# Patient Record
Sex: MALE | Race: WHITE
[De-identification: names, ages, dates, MRNs, and addresses within clinical notes are randomized per-mention and may not be internally consistent; named-entity substitution may affect disease eponyms.]

---

## 2015-12-06 VITALS — DIASTOLIC BLOOD PRESSURE: 75 MMHG | SYSTOLIC BLOOD PRESSURE: 145 MMHG

## 2017-08-18 ENCOUNTER — HOSPITAL ENCOUNTER (OUTPATIENT)
Dept: HOSPITAL 61 - KCIC | Age: 57
Discharge: HOME | End: 2017-08-18
Attending: INTERNAL MEDICINE
Payer: MEDICARE

## 2017-08-18 DIAGNOSIS — I27.2: ICD-10-CM

## 2017-08-18 DIAGNOSIS — I51.7: Primary | ICD-10-CM

## 2017-08-18 PROCEDURE — 71020: CPT

## 2017-08-18 NOTE — [facility]
Indication: Increasing shortness of air over the last 3 weeks.

 

Time of exam 9:39 AM

 

The heart is enlarged. The lungs are clear. No infiltrate or failure is 

detected. No effusion or pneumothorax is seen.

 

IMPRESSION: Cardiomegaly. No acute feature is detected.

 

Electronically signed by: Ran Flores MD (8/18/2017 9:56 AM) [id number]

## 2018-02-16 VITALS — DIASTOLIC BLOOD PRESSURE: 86 MMHG | SYSTOLIC BLOOD PRESSURE: 155 MMHG

## 2020-05-26 ENCOUNTER — HOSPITAL ENCOUNTER (OUTPATIENT)
Dept: HOSPITAL 61 - LAB | Age: 60
Discharge: HOME | End: 2020-05-26
Attending: INTERNAL MEDICINE
Payer: MEDICARE

## 2020-05-26 DIAGNOSIS — I48.91: ICD-10-CM

## 2020-05-26 DIAGNOSIS — Z01.818: Primary | ICD-10-CM

## 2020-05-26 DIAGNOSIS — Z11.59: ICD-10-CM

## 2020-05-26 PROCEDURE — 36415 COLL VENOUS BLD VENIPUNCTURE: CPT

## 2020-05-29 ENCOUNTER — HOSPITAL ENCOUNTER (OUTPATIENT)
Dept: HOSPITAL 61 - SURG | Age: 60
Discharge: HOME | End: 2020-05-29
Attending: INTERNAL MEDICINE
Payer: MEDICARE

## 2020-05-29 VITALS — SYSTOLIC BLOOD PRESSURE: 115 MMHG | DIASTOLIC BLOOD PRESSURE: 80 MMHG

## 2020-05-29 DIAGNOSIS — I08.3: ICD-10-CM

## 2020-05-29 DIAGNOSIS — I48.91: Primary | ICD-10-CM

## 2020-05-29 DIAGNOSIS — Z79.899: ICD-10-CM

## 2020-05-29 LAB
ANION GAP SERPL CALC-SCNC: 14 MMOL/L (ref 6–14)
BUN SERPL-MCNC: 42 MG/DL (ref 8–26)
CALCIUM SERPL-MCNC: 8.7 MG/DL (ref 8.5–10.1)
CHLORIDE SERPL-SCNC: 99 MMOL/L (ref 98–107)
CO2 SERPL-SCNC: 28 MMOL/L (ref 21–32)
CREAT SERPL-MCNC: 7.5 MG/DL (ref 0.7–1.3)
ERYTHROCYTE [DISTWIDTH] IN BLOOD BY AUTOMATED COUNT: 14.3 % (ref 11.5–14.5)
GFR SERPLBLD BASED ON 1.73 SQ M-ARVRAT: 7.4 ML/MIN
GLUCOSE SERPL-MCNC: 95 MG/DL (ref 70–99)
HCT VFR BLD CALC: 36.9 % (ref 39–53)
HGB BLD-MCNC: 12.5 G/DL (ref 13–17.5)
MCH RBC QN AUTO: 36 PG (ref 25–35)
MCHC RBC AUTO-ENTMCNC: 34 G/DL (ref 31–37)
MCV RBC AUTO: 106 FL (ref 79–100)
PLATELET # BLD AUTO: 166 X10^3/UL (ref 140–400)
POTASSIUM SERPL-SCNC: 4.2 MMOL/L (ref 3.5–5.1)
PROTHROMBIN TIME: 29.3 SEC (ref 11.7–14)
RBC # BLD AUTO: 3.5 X10^6/UL (ref 4.3–5.7)
SODIUM SERPL-SCNC: 141 MMOL/L (ref 136–145)
WBC # BLD AUTO: 7.5 X10^3/UL (ref 4–11)

## 2020-05-29 PROCEDURE — 85610 PROTHROMBIN TIME: CPT

## 2020-05-29 PROCEDURE — 85027 COMPLETE CBC AUTOMATED: CPT

## 2020-05-29 PROCEDURE — 93005 ELECTROCARDIOGRAM TRACING: CPT

## 2020-05-29 PROCEDURE — 93312 ECHO TRANSESOPHAGEAL: CPT

## 2020-05-29 PROCEDURE — 36415 COLL VENOUS BLD VENIPUNCTURE: CPT

## 2020-05-29 PROCEDURE — 93325 DOPPLER ECHO COLOR FLOW MAPG: CPT

## 2020-05-29 PROCEDURE — 80048 BASIC METABOLIC PNL TOTAL CA: CPT

## 2020-05-29 PROCEDURE — 92960 CARDIOVERSION ELECTRIC EXT: CPT

## 2020-05-29 NOTE — EKG
[facility]

              [address] [place] [address]

Test Date:    2020               Test Time:    12:59:52

Pat Name:     JUAN BRAXTON             Department:   

Patient ID:   [id number]           Room:          

Gender:       M                        Technician:   

:          1960               Requested By: ISAC CAZARES

Order Number: [id number]           Reading MD:   Isac Cazares MD

                                 Measurements

Intervals                              Axis          

Rate:         69                       P:            1

AL:           228                      QRS:          69

QRSD:         82                       T:            49

QT:           426                                    

QTc:          458                                    

                           Interpretive Statements

SINUS RHYTHM

PROLONGED AL INTERVAL



Electronically Signed On 2020 12:17:35 CDT by Isac Cazares MD

## 2020-05-29 NOTE — EKG
[facility]

              [address] [place] [address]

Test Date:    2020               Test Time:    11:20:20

Pat Name:     JUAN BRAXTON             Department:   

Patient ID:   [id number]           Room:          

Gender:       M                        Technician:   

:          1960               Requested By: ISAC CAZARES

Order Number: [id number]           Reading MD:   Isac Cazares MD

                                 Measurements

Intervals                              Axis          

Rate:         92                       P:            

MO:                                    QRS:          77

QRSD:         74                       T:            89

QT:           386                                    

QTc:          483                                    

                           Interpretive Statements



ATRIAL FIBRILLATION WITH CONTROLLED VENTRICULAR RESPONSE



NON-SPECIFIC ST/T CHANGES

Electronically Signed On 2020 12:16:34 CDT by Isac Cazares MD

## 2020-05-30 NOTE — CARD
MR#: [id number]

Account#: [id number]

Accession#: [id number]

Date of Study: 05/29/2020

Ordering Physician: ISAC CHEN, 

Referring Physician: ISAC CHEN, 

Tech: Selam Sandhu





--------------- APPROVED REPORT --------------





EXAM: Transesophageal echocardiogram with color flow Doppler and Synchronized Cardioversion.



INDICATION

Atrial Fibrillation



Reason For Test : Rule out Intracardiac Thrombus.



PROCEDURE

After obtaining informed consent, patient underwent transesophageal echo in the PACU.

Type of Sedation : General Anesthesia

Sedation was administered by Axel Patel. 

Sedation was achieved with Propofol 110mg intravenously. 

Transesophageal probe was inserted and advanced into esophagus by Petros Chen MD.

The ENRIKE was performed without complications. 

Synchronized Cardioversion attempted: Successful 

Synchronized Cardioversion acheived with 200 Joules after 1 attempt(s). 

Rhythm following Synchronized Cardioversion: Normal Sinus Rhythm 

Throughout the procedure, the blood pressure, pulse oximetry, cardiac rhythm, and rate were monitored
.

The patient tolerated the procedure without adverse effects. Recovery from general anesthesia was une
ventful and vital signs were stable.



 LEFT VENTRICLE 

The left ventricle is normal size. There is borderline to mild concentric left ventricular hypertroph
y. The left ventricular systolic function is normal and the ejection fraction is within normal range.
 EF 50-55% There is normal LV segmental wall motion. No left ventricle thrombus noted on this study.



 RIGHT VENTRICLE 

The right ventricle is normal size. There is normal right ventricular wall thickness. The right ventr
icular systolic function is normal.



 ATRIA 

The left atrium is mildly dilated. The right atrium size is normal. The interatrial septum is intact 
with no evidence for an atrial septal defect or patent foramen ovale as noted on 2-D or Doppler imagi
ng. There is no thrombus noted in the left atrial appendage.



 AORTIC VALVE 

The aortic valve is normal in structure and function. Doppler and Color Flow revealed trace aortic re
gurgitation. There is no significant aortic valvular stenosis.



 MITRAL VALVE 

Mitral annular calcification is mild to moderate. The posterior leaflet is restricted. There is no ev
idence of mitral valve prolapse. Doppler and Color-flow revealed moderate eccentric posteriorly direc
janes mitral regurgitation.



 TRICUSPID VALVE 

The tricuspid valve is normal in structure and function. Doppler and Color Flow revealed mild tricusp
id regurgitation. There is no tricuspid valve stenosis.



 PULMONIC VALVE 

The pulmonary valve is normal in structure and function. Doppler and Color Flow revealed no pulmonic 
valvular regurgitation. There is no pulmonic valvular stenosis.



 GREAT VESSELS 

The aortic root is normal in size. The IVC is normal in size and collapses >50% with inspiration.



Critical Notification

Critical Value: No



<Conclusion>

The left ventricular systolic function is normal and the ejection fraction is within normal range. EF
 50-55%

There is normal LV segmental wall motion.

Mitral annular calcification is mild to moderate. The posterior leaflet is restricted.

Doppler and Color-flow revealed moderate eccentric posteriorly directed mitral regurgitation.



Signed by : Isac Chen, 

Electronically Approved : 05/30/2020 13:12:30

## 2021-06-21 ENCOUNTER — HOSPITAL ENCOUNTER (OUTPATIENT)
Dept: HOSPITAL 61 - SURG | Age: 61
Discharge: HOME | End: 2021-06-21
Attending: INTERNAL MEDICINE
Payer: MEDICARE

## 2021-06-21 VITALS
SYSTOLIC BLOOD PRESSURE: 130 MMHG | DIASTOLIC BLOOD PRESSURE: 69 MMHG | SYSTOLIC BLOOD PRESSURE: 130 MMHG | DIASTOLIC BLOOD PRESSURE: 69 MMHG | DIASTOLIC BLOOD PRESSURE: 69 MMHG | SYSTOLIC BLOOD PRESSURE: 130 MMHG | DIASTOLIC BLOOD PRESSURE: 69 MMHG | SYSTOLIC BLOOD PRESSURE: 130 MMHG | DIASTOLIC BLOOD PRESSURE: 69 MMHG | SYSTOLIC BLOOD PRESSURE: 130 MMHG

## 2021-06-21 VITALS — HEIGHT: 65 IN | WEIGHT: 252.43 LBS | BODY MASS INDEX: 42.06 KG/M2

## 2021-06-21 VITALS — SYSTOLIC BLOOD PRESSURE: 131 MMHG | DIASTOLIC BLOOD PRESSURE: 58 MMHG

## 2021-06-21 DIAGNOSIS — I12.0: ICD-10-CM

## 2021-06-21 DIAGNOSIS — I34.0: ICD-10-CM

## 2021-06-21 DIAGNOSIS — I25.10: ICD-10-CM

## 2021-06-21 DIAGNOSIS — Z79.899: ICD-10-CM

## 2021-06-21 DIAGNOSIS — M19.90: ICD-10-CM

## 2021-06-21 DIAGNOSIS — I48.0: Primary | ICD-10-CM

## 2021-06-21 DIAGNOSIS — Z90.49: ICD-10-CM

## 2021-06-21 DIAGNOSIS — N18.6: ICD-10-CM

## 2021-06-21 DIAGNOSIS — E66.9: ICD-10-CM

## 2021-06-21 DIAGNOSIS — Z99.2: ICD-10-CM

## 2021-06-21 DIAGNOSIS — E03.9: ICD-10-CM

## 2021-06-21 DIAGNOSIS — G47.30: ICD-10-CM

## 2021-06-21 DIAGNOSIS — K21.9: ICD-10-CM

## 2021-06-21 DIAGNOSIS — I48.91: ICD-10-CM

## 2021-06-21 LAB
ANION GAP SERPL CALC-SCNC: 14 MMOL/L (ref 6–14)
BUN SERPL-MCNC: 30 MG/DL (ref 8–26)
CALCIUM SERPL-MCNC: 10.3 MG/DL (ref 8.5–10.1)
CHLORIDE SERPL-SCNC: 105 MMOL/L (ref 98–107)
CO2 SERPL-SCNC: 25 MMOL/L (ref 21–32)
CREAT SERPL-MCNC: 9 MG/DL (ref 0.7–1.3)
GFR SERPLBLD BASED ON 1.73 SQ M-ARVRAT: 6 ML/MIN
GLUCOSE SERPL-MCNC: 107 MG/DL (ref 70–99)
HCT VFR BLD CALC: 32.2 % (ref 39–53)
HGB BLD-MCNC: 10.9 G/DL (ref 13–17.5)
MCHC RBC AUTO-ENTMCNC: 34 G/DL (ref 31–37)
POTASSIUM SERPL-SCNC: 3.5 MMOL/L (ref 3.5–5.1)
PROTHROMBIN TIME: 33.5 SEC (ref 11.7–14)
SODIUM SERPL-SCNC: 144 MMOL/L (ref 136–145)

## 2021-06-21 PROCEDURE — 80048 BASIC METABOLIC PNL TOTAL CA: CPT

## 2021-06-21 PROCEDURE — 92960 CARDIOVERSION ELECTRIC EXT: CPT

## 2021-06-21 PROCEDURE — 93005 ELECTROCARDIOGRAM TRACING: CPT

## 2021-06-21 PROCEDURE — 85018 HEMOGLOBIN: CPT

## 2021-06-21 PROCEDURE — 36415 COLL VENOUS BLD VENIPUNCTURE: CPT

## 2021-06-21 PROCEDURE — 85014 HEMATOCRIT: CPT

## 2021-06-21 PROCEDURE — 85610 PROTHROMBIN TIME: CPT

## 2021-06-21 PROCEDURE — 93312 ECHO TRANSESOPHAGEAL: CPT

## 2021-06-21 NOTE — EKG
[facility]

               [address] [place] [address]

Test Date:    2021               Test Time:    07:59:19

Pat Name:     JUAN BRAXTON             Department:   

Patient ID:   [id number]           Room:          

Gender:       M                        Technician:   

:          1960               Requested By: ISAC CAZARES

Order Number: [id number]           Reading MD:     

                                 Measurements

Intervals                              Axis          

Rate:         66                       P:            

FL:                                    QRS:          57

QRSD:         86                       T:            18

QT:           414                                    

QTc:          436                                    

                           Interpretive Statements

IRREGULAR RHYTHM, NO P-WAVE FOUND

LOW VOLTAGE

ABNORMAL ECG

RI6.02

Compared to ECG 2020 12:59:52

Low QRS voltage now present

Sinus rhythm no longer present

First degree AV block no longer present

## 2021-06-21 NOTE — CARD
MR#: [id number]

Account#: [id number]

Accession#: [id number]

Date of Study: 06/21/2021

Ordering Physician: ISAC CAZARES, 

Referring Physician: ISAC CAZARES, 

Tech: [facility]





--------------- APPROVED REPORT --------------





EXAM: Two-dimensional and M-mode echocardiogram with Doppler and color Doppler.



INDICATION

Atrial Fibrillation



RISK FACTORS

Hypertension 

Obesity   



Reason For Test : Rule out Intracardiac Thrombus.



PROCEDURE

After obtaining informed consent, patient underwent transesophageal echo in the PACU.

Type of Sedation : General Anesthesia

Sedation was administered by anesthesia service. . 

Sedation was achieved with Propofol 100 intravenously. 

Transesophageal probe was inserted and advanced into esophagus by Petros Cazares MD.

The ENRIKE was performed without complications. 

Synchronized Cardioversion acheived with 360 Joules after 2 attempt(s). 

Rhythm following Synchronized Cardioversion: Normal Sinus Rhythm 

Throughout the procedure, the blood pressure, pulse oximetry, cardiac rhythm, and rate were monitored
.

The patient tolerated the procedure without adverse effects. Recovery from general anesthesia was une
ventful and vital signs were stable.



 LEFT VENTRICLE 

The left ventricle is normal size. There is normal left ventricular wall thickness. The left ventricu
lar systolic function is normal and the ejection fraction is within normal range. Estimated ejection 
fraction 50-55%. There is normal LV segmental wall motion. Tissue Doppler imaging reveals moderate le
ft ventricular diastolic dysfunction. No left ventricle thrombus noted on this study. There is no mian
tricular septal defect visualized.



 RIGHT VENTRICLE 

The right ventricle is normal size. There is normal right ventricular wall thickness. The right ventr
icular systolic function is normal.



 ATRIA 

The left atrium is mildly dilated. The right atrium is mildly dilated. The interatrial septum is inta
ct with no evidence for an atrial septal defect or patent foramen ovale as noted on 2-D or Doppler im
aging. There is no thrombus noted in the left atrial appendage.



 AORTIC VALVE 

The aortic valve is normal in structure and function. Doppler and Color Flow revealed no significant 
aortic regurgitation. There is no significant aortic valvular stenosis.



 MITRAL VALVE 

The mitral valve is calcified with restricted leaflet motion with anterior leaflet prolapse. There is
 no evidence of mitral valve prolapse. There is no mitral valve stenosis. Doppler and Color-flow reve
aled moderate mitral regurgitation.



 TRICUSPID VALVE 

The tricuspid valve is normal in structure and function. Doppler and Color Flow revealed no tricuspid
 valve regurgitation noted. There is no tricuspid valve prolapse or vegetation.



 PULMONIC VALVE 

Doppler and Color Flow revealed no pulmonic valvular regurgitation. There is no pulmonic valvular tiffanie
nosis.



 GREAT VESSELS 

The aortic root is normal in size. The ascending aorta is normal in size. The IVC is normal in size a
nd collapses >50% with inspiration.



<Conclusion>

The left ventricular systolic function is normal and the ejection fraction is within normal range.  E
stimated ejection fraction 50-55%.

There is normal LV segmental wall motion.

Doppler and Color-flow revealed moderate mitral regurgitation.

Successful CVN to SR. 



Signed by : Isac Cazares, 

Electronically Approved : 06/21/2021 13:58:21

## 2021-06-23 ENCOUNTER — HOSPITAL ENCOUNTER (OUTPATIENT)
Dept: HOSPITAL 61 - LAB | Age: 61
End: 2021-06-23
Attending: INTERNAL MEDICINE
Payer: MEDICARE

## 2021-06-23 DIAGNOSIS — I48.91: Primary | ICD-10-CM

## 2021-06-23 LAB — PROTHROMBIN TIME: 22.7 SEC (ref 11.7–14)

## 2021-06-23 PROCEDURE — 85610 PROTHROMBIN TIME: CPT

## 2021-06-23 PROCEDURE — 36415 COLL VENOUS BLD VENIPUNCTURE: CPT

## 2021-07-09 ENCOUNTER — HOSPITAL ENCOUNTER (OUTPATIENT)
Dept: HOSPITAL 61 - LAB | Age: 61
End: 2021-07-09
Attending: INTERNAL MEDICINE
Payer: MEDICARE

## 2021-07-09 DIAGNOSIS — I48.21: Primary | ICD-10-CM

## 2021-07-09 LAB — PROTHROMBIN TIME: 24.3 SEC (ref 11.7–14)

## 2021-07-09 PROCEDURE — 36415 COLL VENOUS BLD VENIPUNCTURE: CPT

## 2021-07-09 PROCEDURE — 85610 PROTHROMBIN TIME: CPT

## 2021-10-06 ENCOUNTER — HOSPITAL ENCOUNTER (OUTPATIENT)
Dept: HOSPITAL 61 - RAD | Age: 61
End: 2021-10-06
Attending: INTERNAL MEDICINE
Payer: MEDICARE

## 2021-10-06 DIAGNOSIS — R91.8: Primary | ICD-10-CM

## 2021-10-06 PROCEDURE — 71046 X-RAY EXAM CHEST 2 VIEWS: CPT

## 2021-10-06 NOTE — RAD
EXAM: CHEST 2 VIEWS.



HISTORY: Lung nodule.



COMPARISON: 08/18/2017.



FINDINGS: Frontal and lateral views of the chest are obtained.



There are mild interstitial infiltrates in both bases. No clear lung nodule is identified. There is n
o pneumothorax. There are small bilateral pleural effusions. The heart is mildly enlarged. A stent is
 noted in the left brachycephalic vein.



IMPRESSION:

1. Bilateral basilar predominant interstitial infiltrates are consistent with mild pulmonary edema or
 atypical pneumonia.

2. No lung nodule is identified. Correlate for the site of concern. CT is more sensitive if there is 
persistent concern.



Electronically signed by: MISSY Rojas MD (10/6/2021 12:25 PM) [id number]

## 2021-10-13 ENCOUNTER — HOSPITAL ENCOUNTER (OUTPATIENT)
Dept: HOSPITAL 35 - CATH | Age: 61
Setting detail: OBSERVATION
LOS: 1 days | Discharge: HOME | End: 2021-10-14
Attending: INTERNAL MEDICINE | Admitting: INTERNAL MEDICINE
Payer: COMMERCIAL

## 2021-10-13 VITALS — DIASTOLIC BLOOD PRESSURE: 77 MMHG | SYSTOLIC BLOOD PRESSURE: 139 MMHG

## 2021-10-13 VITALS — HEIGHT: 64.96 IN | WEIGHT: 240 LBS | BODY MASS INDEX: 39.99 KG/M2

## 2021-10-13 VITALS — SYSTOLIC BLOOD PRESSURE: 172 MMHG | DIASTOLIC BLOOD PRESSURE: 68 MMHG

## 2021-10-13 VITALS — DIASTOLIC BLOOD PRESSURE: 90 MMHG | SYSTOLIC BLOOD PRESSURE: 159 MMHG

## 2021-10-13 DIAGNOSIS — N18.6: ICD-10-CM

## 2021-10-13 DIAGNOSIS — G47.33: ICD-10-CM

## 2021-10-13 DIAGNOSIS — E66.9: ICD-10-CM

## 2021-10-13 DIAGNOSIS — I48.0: Primary | ICD-10-CM

## 2021-10-13 DIAGNOSIS — E03.9: ICD-10-CM

## 2021-10-13 DIAGNOSIS — Z99.2: ICD-10-CM

## 2021-10-13 DIAGNOSIS — I12.0: ICD-10-CM

## 2021-10-13 DIAGNOSIS — Z20.822: ICD-10-CM

## 2021-10-13 LAB
ALBUMIN SERPL-MCNC: 3.7 G/DL (ref 3.4–5)
ALT SERPL-CCNC: 20 U/L (ref 16–63)
ANION GAP SERPL CALC-SCNC: 10 MMOL/L (ref 7–16)
APTT BLD: 21.6 SECONDS (ref 24.5–32.8)
AST SERPL-CCNC: 14 U/L (ref 15–37)
BASOPHILS NFR BLD AUTO: 1 % (ref 0–2)
BILIRUB SERPL-MCNC: 0.7 MG/DL (ref 0.2–1)
BUN SERPL-MCNC: 31 MG/DL (ref 7–18)
CALCIUM SERPL-MCNC: 10 MG/DL (ref 8.5–10.1)
CHLORIDE SERPL-SCNC: 104 MMOL/L (ref 98–107)
CO2 SERPL-SCNC: 27 MMOL/L (ref 21–32)
CREAT SERPL-MCNC: 7 MG/DL (ref 0.7–1.3)
EOSINOPHIL NFR BLD: 0.3 % (ref 0–3)
ERYTHROCYTE [DISTWIDTH] IN BLOOD BY AUTOMATED COUNT: 17.5 % (ref 10.5–14.5)
GLUCOSE SERPL-MCNC: 97 MG/DL (ref 74–106)
GRANULOCYTES NFR BLD MANUAL: 77.7 % (ref 36–66)
HCT VFR BLD CALC: 27.9 % (ref 42–52)
HGB BLD-MCNC: 9.2 GM/DL (ref 14–18)
INR PPP: 1.1
LYMPHOCYTES NFR BLD AUTO: 12 % (ref 24–44)
MCH RBC QN AUTO: 34.2 PG (ref 26–34)
MCHC RBC AUTO-ENTMCNC: 33.1 G/DL (ref 28–37)
MCV RBC: 103.5 FL (ref 80–100)
MONOCYTES NFR BLD: 9 % (ref 1–8)
NEUTROPHILS # BLD: 4.6 THOU/UL (ref 1.4–8.2)
PLATELET # BLD: 124 THOU/UL (ref 150–400)
POTASSIUM SERPL-SCNC: 3.8 MMOL/L (ref 3.5–5.1)
PROT SERPL-MCNC: 7.3 G/DL (ref 6.4–8.2)
PROTHROMBIN TIME: 11.9 SECONDS (ref 9.3–11.4)
RBC # BLD AUTO: 2.7 MIL/UL (ref 4.5–6)
SODIUM SERPL-SCNC: 141 MMOL/L (ref 136–145)
WBC # BLD AUTO: 5.9 THOU/UL (ref 4–11)

## 2021-10-13 PROCEDURE — 32100 EXPLORATION OF CHEST: CPT

## 2021-10-13 PROCEDURE — 70005: CPT

## 2021-10-13 PROCEDURE — 62110: CPT

## 2021-10-13 PROCEDURE — 62900: CPT

## 2021-10-13 NOTE — TEE
[facility]
[address]
[place]   [address]                   TRANSESOPHAGEAL ECHOCARDIOGRAM
_______________________________________________________________________________
 
Name:       JUAN BRAXTON                Room #:                     REG [facility]#:      [id number]                       Account #:      [id number]  
Admission:  10/13/21    Attend Phys:    Thad Cuello MD
Discharge:              Date of Birth:  [birth year]  
                                                          Report #: [id number]
                                                                    [id number]
_______________________________________________________________________________
THIS REPORT FOR:  
 
cc:  Efrem Chen MD, Prashanth S. MD Santiago, Patrick MD [facility]                                         
                                                                       ~
 
--------------- APPROVED REPORT --------------
 
 
Study performed:  10/13/2021 08:08:32
 
EXAM: Comprehensive 2D, Doppler, and color-flow 
Echocardiogram 
Patient Location: Out-Patient   
Room #:  EP     
      Status:  routine
 
        BSA:         2.17
HR: 104 bpm   BP:          159/90 mmHg 
Rhythm: Atrial Fibrillation     
 
Other Information 
Study Quality: Good
 
Indications
Atrial Fibrillation
 
Echo Enhancing Agent
Indication: Rule out Shunt
Agent(s) / Amount(s) Used: Agitated Saline 7 cc
 
Procedure
After obtaining informed consent, patient underwent transesophageal 
echo in the EP Lab. 
Type of Sedation : General Anesthesia
Sedation was administered by Anesthesiologist. 
Sedation start time:  0850           Case end Time:  
0900
Transesophageal probe was inserted and advanced into esophagus 
without difficulty by Obed Gallo MD.
Echo enhancement indication: R/O Septal defect.
Echo enhancement agent administered: Agitated Saline
The ENRIKE was performed without complications. 
Throughout the procedure, the blood pressure, pulse oximetry, cardiac 
rhythm, and rate were monitored.
 
 
[facility]
[address]
[place]  [address]
Phone:  [phone]                    TRANSESOPHAGEAL ECHOCARDIOGRAM
_______________________________________________________________________________
 
Name:            JUAN BRAXTON                Room #:                    REG CL
[facility]#:           [id number]          Account #:     [id number]  
Admission:       10/13/21         Attend Phys:   Thad Cuello
Discharge:                  Date of Birth: [birth year]  
                         Report #:      [id number]
        [id number]
_______________________________________________________________________________
The patient tolerated the procedure without adverse effects. Recovery 
from conscious sedation was uneventful and vital signs were 
stable.
 
Left Ventricle
The left ventricle is normal size. There is normal left ventricular 
wall thickness. The left ventricular systolic function is normal. The 
left ventricular ejection fraction is within the normal range. LVEF 
is 55-60%.
 
Right Ventricle
The right ventricle is normal size. The right ventricular systolic 
function is normal.
 
Atria
Left atrium is dilated. No thrombus is visualized in the left atrium 
or appendage. Interatrial septum is intact without evidence of ASD or 
PFO. Right atrium is dilated.
 
Aortic Valve
The aortic valve is normal in structure. No aortic regurgitation is 
present. There is no aortic valvular stenosis.
 
Mitral Valve
The mitral valve is normal in structure. Mild mitral regurgitation. 
No evidence of mitral valve stenosis.
 
Tricuspid Valve
The tricuspid valve is normal in structure. Mild tricuspid 
regurgitation.
 
Pulmonic Valve
The pulmonary valve is normal in structure. There is no pulmonic 
valvular regurgitation.
 
Great Vessels
The aortic root is normal in size.
 
Pericardium
There is no pericardial effusion.
 
<Conclusion>
Consent was obtained and procedure was discussed with the patient 
prior to the ablation
Anesthesia performed by the anesthesiologist
After proper sedation esophageal probe was advanced without 
 
 
[facility]
[address]
[place]  [address]
Phone:  [phone]                    TRANSESOPHAGEAL ECHOCARDIOGRAM
_______________________________________________________________________________
 
Name:            JUAN BRAXTON                Room #:                    REG [facility]#:           [id number]          Account #:     [id number]  
Admission:       10/13/21         Attend Phys:   Thad Cuello
Discharge:                  Date of Birth: [birth year]  
                         Report #:      [id number]
        [id number]
_______________________________________________________________________________
difficulty
Normal left ventricle size/wall thickness ejection fraction of 
60%
Normal right ventricle size/function
Mild biatrial enlargement
 left atrial appendage, moderate size, no obvious mass or clot 
detected
No evidence of ASD/VSD by color flow/bubble study
Mild tricuspid valve insufficiency
No pericardial effusion
Normal aortic root size
 
 
 
 
 
 
 
 
 
 
 
 
 
 
 
 
 
 
 
 
 
 
 
 
 
 
 
 
 
 
 
 
 
  <ELECTRONICALLY SIGNED>
   By: Obed Gallo MD, FACC    
  10/13/21     1018
D: 10/13/21 1018                           _____________________________________
T: 10/13/21 1018                           Obed Gallo MD, FACC      /INF

## 2021-10-13 NOTE — NUR
PT ORIENTED TO ROOM AND UNIT, BED LOW AND LOCKED, SIDE RAILS UP X3 CALL LIGHT
IN REACH, TELE APPLIED, RIGHT GROIN CDI WITH NO HEMATOMA. WILL CONTINUE TO
ASSESS.

## 2021-10-13 NOTE — NUR
ASSESS RIGHT GROIN WITH ILIR PLATA. REMAINS CDI WITH NO HEMATOMA. CPAP FROM HOME
AT BEDSIDE RT WILL SET UP FOR PT.

## 2021-10-14 VITALS — DIASTOLIC BLOOD PRESSURE: 65 MMHG | SYSTOLIC BLOOD PRESSURE: 146 MMHG

## 2021-10-14 VITALS — SYSTOLIC BLOOD PRESSURE: 124 MMHG | DIASTOLIC BLOOD PRESSURE: 73 MMHG

## 2021-10-14 NOTE — NUR
TOOK OVER CARE FOR PATIENT AT 0700. PATIENT RECEIVING HEMODIALYSIS AT THIS
TIME. 2 L TAKEN OFF FROM DIALYSIS. VITAL SIGNS STABLE. PATIENT DENIES ANY
PAIN, DIZZINESS, HEADACHE, ETC. PATIENT RESTING COMFORTABLY IN BED. DENIES ANY
NEEDS. DISCHARGE EDUCATION PROVIDED AND FOLLOW UP APPOINTMENT MADE WITH
CARDIOLOGY. NO QUESTIONS OR CONCERNS AT THIS TIME.

## 2021-12-30 ENCOUNTER — HOSPITAL ENCOUNTER (OUTPATIENT)
Dept: HOSPITAL 61 - RAD | Age: 61
End: 2021-12-30
Attending: INTERNAL MEDICINE
Payer: MEDICARE

## 2021-12-30 DIAGNOSIS — I51.7: Primary | ICD-10-CM

## 2021-12-30 DIAGNOSIS — J90: ICD-10-CM

## 2021-12-30 DIAGNOSIS — I50.22: ICD-10-CM

## 2021-12-30 PROCEDURE — 71046 X-RAY EXAM CHEST 2 VIEWS: CPT

## 2021-12-30 NOTE — RAD
EXAM: Chest, 2 views.



HISTORY: Congestive heart failure.



COMPARISON: 10/6/2021



FINDINGS: 2 views of the chest are obtained. There is a stable small left pleural effusion. There is 
stable cardiomegaly. There is no consolidation. There is no pneumothorax. There is a stent overlying 
the arch great vessels.



IMPRESSION: Stable small left pleural effusion and cardiomegaly.



Electronically signed by: Shelli Terry MD (12/30/2021 10:34 AM) [id number]